# Patient Record
Sex: MALE | URBAN - METROPOLITAN AREA
[De-identification: names, ages, dates, MRNs, and addresses within clinical notes are randomized per-mention and may not be internally consistent; named-entity substitution may affect disease eponyms.]

---

## 2019-12-28 ENCOUNTER — OFFICE VISIT (OUTPATIENT)
Dept: URBAN - METROPOLITAN AREA CLINIC 22 | Facility: CLINIC | Age: 64
End: 2019-12-28
Payer: COMMERCIAL

## 2019-12-28 DIAGNOSIS — H52.4 PRESBYOPIA: Primary | ICD-10-CM

## 2019-12-28 DIAGNOSIS — H04.123 DRY EYE SYNDROME OF BILATERAL LACRIMAL GLANDS: ICD-10-CM

## 2019-12-28 PROCEDURE — 92015 DETERMINE REFRACTIVE STATE: CPT | Performed by: OPTOMETRIST

## 2019-12-28 PROCEDURE — 92012 INTRM OPH EXAM EST PATIENT: CPT | Performed by: OPTOMETRIST

## 2019-12-28 ASSESSMENT — VISUAL ACUITY
OS: 20/20
OD: 20/20

## 2019-12-28 ASSESSMENT — INTRAOCULAR PRESSURE
OD: 16
OS: 17

## 2019-12-28 NOTE — IMPRESSION/PLAN
Impression: Dry eye syndrome of bilateral lacrimal glands: H04.123. OU. Plan: Explained that there are three layers of the tear film (oil, mucous and water) and an imbalance in any of the layers can result in dry eyes. This can result in decreased or fluctuating vision. Pt reviewed that there is no cure and daily maintenance is needed. Artificial Tears QOD or more OU is recommended. AFT's can be purchased OTC. Discussed with the patient they may use them as often as needed.